# Patient Record
Sex: MALE | Race: WHITE | HISPANIC OR LATINO | Employment: FULL TIME | URBAN - METROPOLITAN AREA
[De-identification: names, ages, dates, MRNs, and addresses within clinical notes are randomized per-mention and may not be internally consistent; named-entity substitution may affect disease eponyms.]

---

## 2023-11-01 ENCOUNTER — HOSPITAL ENCOUNTER (EMERGENCY)
Facility: HOSPITAL | Age: 53
Discharge: HOME | End: 2023-11-01
Attending: EMERGENCY MEDICINE
Payer: OTHER GOVERNMENT

## 2023-11-01 VITALS
HEART RATE: 86 BPM | HEIGHT: 67 IN | SYSTOLIC BLOOD PRESSURE: 140 MMHG | BODY MASS INDEX: 26.37 KG/M2 | DIASTOLIC BLOOD PRESSURE: 92 MMHG | TEMPERATURE: 98 F | OXYGEN SATURATION: 98 % | RESPIRATION RATE: 18 BRPM | WEIGHT: 168 LBS

## 2023-11-01 DIAGNOSIS — J06.9 VIRAL URI: Primary | ICD-10-CM

## 2023-11-01 LAB
CTP QC/QA: YES
MOLECULAR STREP A: NEGATIVE
POC MOLECULAR INFLUENZA A AGN: NEGATIVE
POC MOLECULAR INFLUENZA B AGN: NEGATIVE
SARS-COV-2 RDRP RESP QL NAA+PROBE: NEGATIVE

## 2023-11-01 PROCEDURE — 99283 EMERGENCY DEPT VISIT LOW MDM: CPT

## 2023-11-01 PROCEDURE — 87635 SARS-COV-2 COVID-19 AMP PRB: CPT

## 2023-11-01 PROCEDURE — 87502 INFLUENZA DNA AMP PROBE: CPT

## 2023-11-01 PROCEDURE — 87651 STREP A DNA AMP PROBE: CPT

## 2023-11-01 RX ORDER — CETIRIZINE HYDROCHLORIDE 10 MG/1
10 TABLET ORAL DAILY
Qty: 30 TABLET | Refills: 0 | Status: SHIPPED | OUTPATIENT
Start: 2023-11-01 | End: 2023-11-01 | Stop reason: CLARIF

## 2023-11-01 RX ORDER — FLUTICASONE PROPIONATE 50 MCG
1 SPRAY, SUSPENSION (ML) NASAL 2 TIMES DAILY PRN
Qty: 15 G | Refills: 0 | Status: SHIPPED | OUTPATIENT
Start: 2023-11-01

## 2023-11-01 RX ORDER — CETIRIZINE HYDROCHLORIDE 10 MG/1
10 TABLET ORAL DAILY
Qty: 30 TABLET | Refills: 0 | Status: SHIPPED | OUTPATIENT
Start: 2023-11-01 | End: 2023-11-01 | Stop reason: SDUPTHER

## 2023-11-01 RX ORDER — ONDANSETRON 4 MG/1
4 TABLET, ORALLY DISINTEGRATING ORAL 2 TIMES DAILY PRN
Qty: 8 TABLET | Refills: 0 | Status: SHIPPED | OUTPATIENT
Start: 2023-11-01

## 2023-11-01 NOTE — ED PROVIDER NOTES
Encounter Date: 11/1/2023    SCRIBE #1 NOTE: I, Amber Cordongilma Nuno, am scribing for, and in the presence of,  Jen Cordova PA-C. I have scribed the following portions of the note - Other sections scribed: HPI, ROS, PE.       History     Chief Complaint   Patient presents with    Nasal Congestion     Congestion, sore throat, nausea and diarrhea for past 3 days     53 y. o. male with no pertinent PMHx presents to the ED for URI symptoms and diarrhea which is now improving.  Patient reports he is had nasal congestion, sore throat and intermittent rhinorrhea over the last 5 days.  He stated he was drinking with some friends on Saturday night and woke up Sunday with some abdominal upset.  He had 1 bout of vomiting that day and it has noted intermittent episodes of diarrhea over the last 3 days.  He states that diarrhea episodes are resolving as of today.  He states that some coworkers at work have tested positive for strep and flu over the last week.  No other alleviating or exacerbating factors. Denies any appetite change, cough, chest tightness, chest pain, SOB or other associated symptoms. NKDA.       The history is provided by the patient. No  was used.     Review of patient's allergies indicates:  No Known Allergies  History reviewed. No pertinent past medical history.  History reviewed. No pertinent surgical history.  History reviewed. No pertinent family history.  Social History     Tobacco Use    Smoking status: Never    Smokeless tobacco: Never   Substance Use Topics    Drug use: Never     Review of Systems   Constitutional:  Positive for fatigue (is resolved). Negative for appetite change and fever.   HENT:  Positive for congestion (mild), postnasal drip, rhinorrhea and sore throat (mild). Negative for ear pain, sinus pressure, sinus pain, trouble swallowing and voice change.    Eyes:  Negative for photophobia, pain and visual disturbance.   Respiratory:  Negative for cough, chest  tightness and shortness of breath.    Cardiovascular:  Negative for chest pain and leg swelling.   Gastrointestinal:  Positive for abdominal pain, diarrhea (is resolved), nausea (is resolved) and vomiting (is resolved). Negative for abdominal distention.   Genitourinary:  Negative for decreased urine volume, dysuria and hematuria.   Musculoskeletal:  Negative for back pain, myalgias and neck pain.   Skin:  Negative for rash.   Neurological:  Negative for syncope, weakness and headaches.       Physical Exam     Initial Vitals [11/01/23 1618]   BP Pulse Resp Temp SpO2   (!) 140/92 86 18 97.5 °F (36.4 °C) 98 %      MAP       --         Physical Exam    Nursing note and vitals reviewed.  Constitutional: He appears well-developed and well-nourished. He is not diaphoretic. No distress.   HENT:   Head: Normocephalic and atraumatic.   Right Ear: External ear normal.   Left Ear: External ear normal.   Nose: Nose normal.   Mouth/Throat: Oropharynx is clear and moist and mucous membranes are normal. No oropharyngeal exudate.   Bilateral tympanic membranes intact with clear serous fluid.  No erythema or signs of infection.  Bilateral nares patent, no rhinorrhea.  Oropharynx erythematous with cobblestoning pattern indicative of postnasal drip.  Bilateral tonsils unremarkable with no enlargement, exudate or erythema.  Handling oral secretions well.    Eyes: Conjunctivae and EOM are normal. Pupils are equal, round, and reactive to light. Right conjunctiva is not injected. Left conjunctiva is not injected. No scleral icterus.   sclera anicteric   Neck: Neck supple.   Normal range of motion.   Full passive range of motion without pain.     Cardiovascular:  Normal rate, regular rhythm, S1 normal, S2 normal, normal heart sounds and intact distal pulses.     Exam reveals no gallop and no friction rub.       No murmur heard.  Pulses:       Radial pulses are 2+ on the right side and 2+ on the left side.   Pulmonary/Chest: Effort normal  and breath sounds normal. No respiratory distress. He has no wheezes.   Abdominal: Abdomen is soft. Bowel sounds are normal. He exhibits no distension. There is no abdominal tenderness.   Exam unremarkable    Abdomen  is nondistended, soft and nontender in all other quadrants. Normoactive bowel sounds. Nonperitoneal, no guarding or rigidity. No palpable masses or hepatosplenomegaly.  No suprapubic pain. No CVAT.    No overlying skin changes. Distal pulses 2+ b/l. (-)  Guardado's sign. (-)  Rebound Tenderness      Musculoskeletal:         General: Normal range of motion.      Cervical back: Full passive range of motion without pain, normal range of motion and neck supple.      Comments: Good active ROM of all extremities. No lower extremity edema or cyanosis.     Lymphadenopathy:     He has no cervical adenopathy.   Neurological: He is alert and oriented to person, place, and time. No cranial nerve deficit or sensory deficit. Gait normal.   A&Ox4, normal speech   Skin: Skin is warm. No ecchymosis and no rash noted.   Psychiatric: He has a normal mood and affect. Thought content normal.         ED Course   Procedures  Labs Reviewed   SARS-COV-2 RDRP GENE   POCT STREP A MOLECULAR   POCT INFLUENZA A/B MOLECULAR          Imaging Results    None          Medications - No data to display  Medical Decision Making  This is an evaluation of a 53 y.o. male that presents to the Emergency Department for improving rhinorrhea and nasal congestion for 5 days and resolved GI upset. The patient is a non-toxic, afebrile, and well appearing male. On physical exam ears and pharynx are without evidence of infection. Appears well hydrated with moist mucus membranes. Neck soft and supple with no meningeal signs or cervical lymphadenopathy.  Erythematous oropharynx with notable cobblestoning. Breath sounds are clear and equal bilaterally with no adventitious breath sounds, tachypnea or respiratory distress with room air pulse ox of 98% and no  evidence of hypoxia.     Vital Signs Are Reassuring.  Covid, flu and strep negative.    My overall impression is Viral URI with resolved viral gastroenteritis. I considered, but at this time, do not suspect OM, OE, PTA, strep pharyngitis, retropharyngeal abscess, espinoza angina, meningitis, pneumonia, UTI, testicular torsion, appendicitis, pancreatitis, or acute bacterial sinusitis.    Patient discharged home with supportive care including antihistamine and nasal spray.  Also sent home with short course of antiemetics in case nausea persists.  Emphasized the importance of oral hydration and monitoring for fevers.  Advised to treat with Tylenol or ibuprofen for symptoms the 100.4 and above. The diagnosis, treatment plan, instructions for follow-up and reevaluation with PCP as well as ED return precautions were discussed and understanding was verbalized. All questions or concerns have been addressed.     Amount and/or Complexity of Data Reviewed  Labs: ordered. Decision-making details documented in ED Course.    Risk  OTC drugs.  Prescription drug management.  Diagnosis or treatment significantly limited by social determinants of health.            Scribe Attestation:   Scribe #1: I performed the above scribed service and the documentation accurately describes the services I performed. I attest to the accuracy of the note.        ED Course as of 11/03/23 1354   Wed Nov 01, 2023   1700 SARS-CoV-2 RNA, Amplification, Qual: Negative [CC]   1700 POC Molecular Influenza A Ag: Negative [CC]   1700 POC Molecular Influenza B Ag: Negative [CC]   1700 Molecular Strep A, POC: Negative [CC]      ED Course User Index  [CC] Jen Cordova PA-C I, Carly Caballero, PA-C, personally performed the services described in this documentation. All medical record entries made by the scribe were at my direction and in my presence. I have reviewed the chart and agree that the record reflects my personal performance and is  accurate and complete.     Clinical Impression:   Final diagnoses:  [J06.9] Viral URI (Primary)        ED Disposition Condition    Discharge Stable          ED Prescriptions       Medication Sig Dispense Start Date End Date Auth. Provider    ondansetron (ZOFRAN-ODT) 4 MG TbDL Take 1 tablet (4 mg total) by mouth 2 (two) times daily as needed (nausea). 8 tablet 11/1/2023 -- Jen Cordova PA-C    fluticasone propionate (FLONASE) 50 mcg/actuation nasal spray 1 spray (50 mcg total) by Each Nostril route 2 (two) times daily as needed for Rhinitis or Allergies. 15 g 11/1/2023 -- Jen Cordova PA-C    cetirizine (ZYRTEC) 10 MG tablet  (Status: Discontinued) Take 1 tablet (10 mg total) by mouth once daily. 30 tablet 11/1/2023 11/1/2023 Jen Cordova PA-C    cetirizine (ZYRTEC) 10 MG tablet  (Status: Discontinued) Take 1 tablet (10 mg total) by mouth once daily. 30 tablet 11/1/2023 11/1/2023 Jen Cordova PA-C          Follow-up Information       Follow up With Specialties Details Why Contact Info    [facility] - Emergency Dept Emergency Medicine Go to  For new or worsening symptoms [address] [facility] [place] [id number] [phone]             Jen Cordova PA-C  11/03/23 3370

## 2023-11-01 NOTE — DISCHARGE INSTRUCTIONS
It is important that to rest and stay hydrated.  Use nasal spray to help with nasal congestion and postnasal drip. The 'BRAT' diet is suggested, then progress to diet as tolerated as symptoms audra. Call if bloody stools, persistent diarrhea, vomiting, fever or abdominal pain.  Follow up with PCP for further evaluation and management as necessary.    Thank you for coming to our Emergency Department today. It is important to remember that some problems or medical conditions are difficult to diagnose and may not be found or addressed during your Emergency Department visit.  These conditions often start with non-specific symptoms and can only be diagnosed on follow up visits with your primary care physician or specialist when the symptoms continue or change. Please remember that all medical conditions can change, and we cannot predict how you will be feeling tomorrow or the next day. Return to the ER with any questions/concerns, new/concerning symptoms, worsening or failure to improve.     Please return to ER if you experience severe dizziness, fever higher then 100.4 that persist after medication administration, uncontrolled nausea/vomiting or diarrhea or any other major concern like increased pain, chest pain, shortness of breath, inability to pass stool or gas, or difficulty breathing or swelling of the throat/mouth/tongue.    Be sure to follow up with your primary care doctor and review all labs/imaging/tests that were performed during your ER visit with them. It is very common for us to identify non-emergent incidental findings which must be followed up with your primary care physician.  Some labs/imaging/tests may be outside of the normal range, and require non-emergent follow-up and/or further investigation/treatment/procedures/testing to help diagnose/exclude/prevent complications or other potentially serious medical conditions. Some abnormalities may not have been discussed or addressed during your ER visit.      An ER visit does not replace a primary care visit, and many screening tests or follow-up tests cannot be ordered by an ER doctor or performed by the ER. Some tests may even require pre-approval.    If you do not have a primary care doctor, you may contact the one listed on your discharge paperwork or you may also call the [facility] Appointment Desk at [phone] , or [address] [place] [address] at  [phone] to schedule an appointment, or establish care with a primary care doctor or even a specialist and to obtain information about local resources. It is important to your health that you have a primary care doctor.    Please take all medications as directed. We have done our best to select a medication for you that will treat your condition however, all medications may potentially have side-effects and it is impossible to predict which medications may give you side-effects or what those side-effects (if any) those medications may give you.  If you feel that you are having a negative effect or side-effect of any medication you should stop taking those medications immediately and seek medical attention. If you feel that you are having a life-threatening reaction call 911.      Do not drive, swim, climb to height, take a bath, operate heavy machinery, drink alcohol or take potentially sedating medications, sign any legal documents or make any important decisions for 24 hours if you have received any pain medications, sedatives or mood altering drugs during your ER visit or within 24 hours of taking them if they have been prescribed to you.     You can find additional resources for Dentists, hearing aids, durable medical equipment, low cost pharmacies and other resources at [email or web address]

## 2023-11-29 ENCOUNTER — HOSPITAL ENCOUNTER (EMERGENCY)
Facility: HOSPITAL | Age: 53
Discharge: HOME | End: 2023-11-29
Attending: EMERGENCY MEDICINE
Payer: OTHER GOVERNMENT

## 2023-11-29 ENCOUNTER — OFFICE VISIT (OUTPATIENT)
Dept: OPHTHALMOLOGY | Facility: CLINIC | Age: 53
End: 2023-11-29
Attending: OPHTHALMOLOGY
Payer: OTHER GOVERNMENT

## 2023-11-29 ENCOUNTER — TELEPHONE (OUTPATIENT)
Dept: OPHTHALMOLOGY | Facility: CLINIC | Age: 53
End: 2023-11-29
Payer: OTHER GOVERNMENT

## 2023-11-29 VITALS
BODY MASS INDEX: 26.53 KG/M2 | TEMPERATURE: 99 F | HEIGHT: 67 IN | HEART RATE: 80 BPM | OXYGEN SATURATION: 100 % | WEIGHT: 169 LBS | DIASTOLIC BLOOD PRESSURE: 79 MMHG | SYSTOLIC BLOOD PRESSURE: 136 MMHG | RESPIRATION RATE: 20 BRPM

## 2023-11-29 DIAGNOSIS — H16.401 CORNEAL NEOVASCULARIZATION OF RIGHT EYE: ICD-10-CM

## 2023-11-29 DIAGNOSIS — H53.9 VISUAL DISTURBANCE: ICD-10-CM

## 2023-11-29 DIAGNOSIS — H35.052 CNVM (CHOROIDAL NEOVASCULAR MEMBRANE), LEFT: Primary | ICD-10-CM

## 2023-11-29 LAB
ALLENS TEST: NORMAL
ANION GAP SERPL CALC-SCNC: 16 MMOL/L (ref 8–16)
BUN SERPL-MCNC: 18 MG/DL (ref 6–30)
CHLORIDE SERPL-SCNC: 98 MMOL/L (ref 95–110)
CHOLEST SERPL-MCNC: 192 MG/DL (ref 120–199)
CHOLEST/HDLC SERPL: 3.1 {RATIO} (ref 2–5)
CREAT SERPL-MCNC: 1.2 MG/DL (ref 0.5–1.4)
GLUCOSE SERPL-MCNC: 92 MG/DL (ref 70–110)
HCT VFR BLD CALC: 50 %PCV (ref 36–54)
HDLC SERPL-MCNC: 62 MG/DL (ref 40–75)
HDLC SERPL: 32.3 % (ref 20–50)
LDLC SERPL CALC-MCNC: 110 MG/DL (ref 63–159)
NONHDLC SERPL-MCNC: 130 MG/DL
POC IONIZED CALCIUM: 1.25 MMOL/L (ref 1.06–1.42)
POC TCO2 (MEASURED): 29 MMOL/L (ref 23–29)
POTASSIUM BLD-SCNC: 3.8 MMOL/L (ref 3.5–5.1)
SAMPLE: NORMAL
SITE: NORMAL
SODIUM BLD-SCNC: 138 MMOL/L (ref 136–145)
TRIGL SERPL-MCNC: 100 MG/DL (ref 30–150)
TROPONIN I SERPL DL<=0.01 NG/ML-MCNC: <0.006 NG/ML (ref 0–0.03)
TSH SERPL DL<=0.005 MIU/L-ACNC: 1.67 UIU/ML (ref 0.4–4)

## 2023-11-29 PROCEDURE — 25000003 PHARM REV CODE 250: Performed by: EMERGENCY MEDICINE

## 2023-11-29 PROCEDURE — 99285 EMERGENCY DEPT VISIT HI MDM: CPT | Mod: 25,27

## 2023-11-29 PROCEDURE — 99999 PR PBB SHADOW E&M-EST. PATIENT-LVL III: ICD-10-PCS | Mod: PBBFAC,,, | Performed by: OPHTHALMOLOGY

## 2023-11-29 PROCEDURE — 99213 OFFICE O/P EST LOW 20 MIN: CPT | Mod: PBBFAC,25,PO | Performed by: OPHTHALMOLOGY

## 2023-11-29 PROCEDURE — 25500020 PHARM REV CODE 255: Performed by: EMERGENCY MEDICINE

## 2023-11-29 PROCEDURE — 84443 ASSAY THYROID STIM HORMONE: CPT | Performed by: EMERGENCY MEDICINE

## 2023-11-29 PROCEDURE — 99204 PR OFFICE/OUTPT VISIT, NEW, LEVL IV, 45-59 MIN: ICD-10-PCS | Mod: 25,S$PBB,, | Performed by: OPHTHALMOLOGY

## 2023-11-29 PROCEDURE — 80048 BASIC METABOLIC PNL TOTAL CA: CPT

## 2023-11-29 PROCEDURE — 93005 ELECTROCARDIOGRAM TRACING: CPT

## 2023-11-29 PROCEDURE — 67028 PR INJECT INTRAVITREAL PHARMCOLOGIC: ICD-10-PCS | Mod: S$PBB,LT,, | Performed by: OPHTHALMOLOGY

## 2023-11-29 PROCEDURE — 82565 ASSAY OF CREATININE: CPT

## 2023-11-29 PROCEDURE — 99204 OFFICE O/P NEW MOD 45 MIN: CPT | Mod: 25,S$PBB,, | Performed by: OPHTHALMOLOGY

## 2023-11-29 PROCEDURE — 99900035 HC TECH TIME PER 15 MIN (STAT)

## 2023-11-29 PROCEDURE — 67028 INJECTION EYE DRUG: CPT | Mod: S$PBB,LT,, | Performed by: OPHTHALMOLOGY

## 2023-11-29 PROCEDURE — 84484 ASSAY OF TROPONIN QUANT: CPT | Performed by: EMERGENCY MEDICINE

## 2023-11-29 PROCEDURE — 85014 HEMATOCRIT: CPT

## 2023-11-29 PROCEDURE — 80061 LIPID PANEL: CPT | Performed by: EMERGENCY MEDICINE

## 2023-11-29 PROCEDURE — 84132 ASSAY OF SERUM POTASSIUM: CPT

## 2023-11-29 PROCEDURE — 84295 ASSAY OF SERUM SODIUM: CPT

## 2023-11-29 PROCEDURE — 99999 PR PBB SHADOW E&M-EST. PATIENT-LVL III: CPT | Mod: PBBFAC,,, | Performed by: OPHTHALMOLOGY

## 2023-11-29 PROCEDURE — 92134 POSTERIOR SEGMENT OCT RETINA (OCULAR COHERENCE TOMOGRAPHY)-BOTH EYES: ICD-10-PCS | Mod: 26,S$PBB,, | Performed by: OPHTHALMOLOGY

## 2023-11-29 PROCEDURE — 93010 EKG 12-LEAD: ICD-10-PCS | Mod: ,,, | Performed by: INTERNAL MEDICINE

## 2023-11-29 PROCEDURE — 82330 ASSAY OF CALCIUM: CPT

## 2023-11-29 PROCEDURE — 67028 INJECTION EYE DRUG: CPT | Mod: PBBFAC,PO,LT | Performed by: OPHTHALMOLOGY

## 2023-11-29 PROCEDURE — 92134 CPTRZ OPH DX IMG PST SGM RTA: CPT | Mod: PBBFAC,PO | Performed by: OPHTHALMOLOGY

## 2023-11-29 PROCEDURE — 93010 ELECTROCARDIOGRAM REPORT: CPT | Mod: ,,, | Performed by: INTERNAL MEDICINE

## 2023-11-29 RX ORDER — SODIUM CHLORIDE 9 MG/ML
1000 INJECTION, SOLUTION INTRAVENOUS
Status: COMPLETED | OUTPATIENT
Start: 2023-11-29 | End: 2023-11-29

## 2023-11-29 RX ADMIN — Medication 1.25 MG: at 05:11

## 2023-11-29 RX ADMIN — SODIUM CHLORIDE 1000 ML: 0.9 INJECTION, SOLUTION INTRAVENOUS at 02:11

## 2023-11-29 RX ADMIN — IOHEXOL 75 ML: 350 INJECTION, SOLUTION INTRAVENOUS at 01:11

## 2023-11-29 NOTE — PROGRESS NOTES
Subjective:       Patient ID: Evgeny Ragland is a 53 y.o. male      Chief Complaint   Patient presents with    Concerns About Ocular Health     History of Present Illness  HPI    Pt states at 11:25 AM he was at his desk and noticed his vision in his   left eye became blurry and distorted.  He drove him self to the ER and   then he started to see a black shaped kidney bean shape. Pt was told years   ago that he was at risk for retinal detachment. Ultrasound was done at the   ER.      2006 IED blast partially detached retina in OS - pt never followed back up   with retinal specialist   Had a procedure OS.  Was told that it didn't fix the whole problem and   that he had a scar.  Gets yearly eye exams  -Flashes   -Floaters   -pain     H/o nearsightedness and glasses use in youth then refractive surgery    Last edited by Alfredo Huerta MD on 11/29/2023 10:24 PM.        Imaging:    See report    Assessment/Plan:     1. 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  Recommend Avastin OS.  Discussed RBA inc endophthalmitis  Discussed injection protocol, TREX, and visual expectations  Pt agrees with inj.  Wishes to proceed today    - Posterior Segment OCT Retina-Both eyes  - Prior authorization Order    Follow up in about 1 month (around 12/29/2023), or if symptoms worsen or fail to improve, for OCT and INJECTION ONLY, Injection Left eye, Avastin.     Patient identified.  Timeout performed.    Risks, benefits, and alternatives to treatment were discussed in detail with the patient, including bleeding/infection (endophthalmitis)/etc.  The patient voiced understanding and wished to proceed with the procedure.  See separate consent form.    Injection Procedure Note:  Diagnosis: CNVM Left Eye    Topical Proparacaine drop placed then topical 5%  Writer contacted patient. Informed patient that patients PCP office wants her to obtain a chest x-ray prior to surgical clearance. Patient verbalized understanding and is going to call her PCP office to get this chest x-ray completed. Informed patient that she should have this performed as soon as she can this morning to get cleared for surgery.    Patient verbalized understanding and had no further questions or concerns at this time.    Addendum:  Writer contacted office of [facility]. Patient had Chest X-Ray today and was cleared by [facility]. They are faxing over clearance paperwork stating that patient is now cleared for surgery. Office fax number provided. Will update Dr. Spring and AKI Alexander.   Betadine  Sterile gloves used, and sterile lid speculum placed.  5% Betadine placed at injection site again prior to injection.  Avastin 1.25mg in 0.05cc Injected inferotemporally 3.5-4mm posterior to the limbus.  Complications: None  Va at least CF at 5 feet post injection.  Retina, ONH, IOP normal after injection.    Followup as above.  Patient should return immediately PRN.  Retinal Detachment and Endophthalmitis precautions given.
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
## 2023-11-29 NOTE — DISCHARGE INSTRUCTIONS
Emergency department testing is within acceptable ranges.  CT scan of brain does not show stroke.  EKG and troponin do not suggest heart attack.  Your symptoms are likely related to a retinal issue.  Go to the ophthalmology clinic now for further evaluation.  Schedule follow-up with your primary physician.  Return to the emergency department for any new, worsening or significantly concerning symptoms.    Thank you for coming to our Emergency Department today. It is important to remember that some problems are difficult to diagnose and may not be found during your first visit. Be sure to follow up with your primary care doctor and review any labs/imaging that was performed with them. If you do not have a primary care doctor, you may contact the one listed on your discharge paperwork or you may also call the [facility] Appointment Desk at [phone] to schedule an appointment with one.     All medications may potentially have side effects and it is impossible to predict which medications may give you side effects. If you feel that you are having a negative effect of any medication you should immediately stop taking them and seek medical attention.    Return to the ER with any questions/concerns, new/concerning symptoms, worsening or failure to improve. Do not drive or make any important decisions for 24 hours if you have received any pain medications, sedatives or mood altering drugs during your ER visit.

## 2023-11-29 NOTE — TELEPHONE ENCOUNTER
----- Message from Chacha Kemp sent at 11/29/2023  2:47 PM CST -----  Consult/Advisory    Name Of Caller:Dr Hahn       Contact Preference:[phone]    Nature of call: Emergency  called regarding the ptn he will like the ptn to be seen today regarding possible retina detach

## 2023-11-29 NOTE — ED TRIAGE NOTES
"Pt to ED via POV with complaints of sudden onset of blurry vision to L eye, onset at 1130. States was sitting at desk and it all of a sudden became blurry. Pt states "thought it was because just got new glasses." Pt stating he now also has a "black spot in the shape of a kidney bean" in visual field. No pain. Pt awoke this AM with tingling to left arm for a few minutes. Reports "dog slept in bed with him so he may have slept wrong." Resolved after movement. Denies weakness, change in speech or dizziness. 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 NADN  "

## 2023-12-06 ENCOUNTER — TELEPHONE (OUTPATIENT)
Dept: SPORTS MEDICINE | Facility: CLINIC | Age: 53
End: 2023-12-06
Payer: OTHER GOVERNMENT

## 2023-12-06 NOTE — TELEPHONE ENCOUNTER
----- Message from Elsy Yuen MA sent at 12/6/2023  4:23 PM CST -----  Regarding: FW: Appt  Contact: [phone]  Please call and schedule with Alba.  ----- Message -----  From: Tami Pitt  Sent: 12/6/2023   4:08 PM CST  To: [facility]baum Staff  Subject: Appt                                             Pt is calling to speak to someone in the office to [facility] care Pt Dx Lower Left Leg calf Pt would like like to schedule an appt.Please call to advise  [phone] Thanks.

## 2023-12-11 NOTE — PROGRESS NOTES
CC: Left lower leg pain    53 y.o. Male who presents as a new patient to me.  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 .   [employer].  He reports an injury event about 2 months ago when he was running he had a painful pop over the posterior medial aspect of his knee and proximal lower leg region.  Localizes to the medial gastroc head region.  He states afterwards he had some bruising over that area.  He had some pain and mobility restriction around the calf region with some difficulty going up on his toes.  Self-directed therapy and this has gotten considerably better with time.  He does have some ongoing mild symptoms to include weakness and some intermittent associated discomfort.  He has returned to running and is feeling better.  Reports no knee specific issues.  Past medical history notable for prior bilateral knee arthroscopy and left partial medial meniscectomy around 2003.  At one point he also had a MCL repair by his account.  He is unsure which side.  Has not had issues in the left knee for quite some time.  No injections.  No back or radicular complaints.  No ipsilateral groin or hip pain.    REVIEW OF SYSTEMS:   Constitution: Negative. Negative for chills, fever and night sweats.    Hematologic/Lymphatic: Negative for bleeding problem. Does not bruise/bleed easily.   Skin: Negative for dry skin, itching and rash.   Musculoskeletal: Negative for falls. Positive for intermittent left lower leg soreness and muscle weakness.     All other review of symptoms were reviewed and found to be noncontributory.     PAST MEDICAL HISTORY:   History reviewed. No pertinent past medical history.    PAST SURGICAL HISTORY:   History reviewed. No pertinent surgical history.    FAMILY HISTORY:   History reviewed. No pertinent family history.    SOCIAL HISTORY:   Social History     Socioeconomic History    Marital status:    Tobacco Use    Smoking status: Never    Smokeless  tobacco: Never   Substance and Sexual Activity    Drug use: Never     Social Determinants of Health     Financial Resource Strain: Low Risk  (12/9/2023)    Overall Financial Resource Strain (CARDIA)     Difficulty of Paying Living Expenses: Not hard at all   Food Insecurity: No Food Insecurity (12/9/2023)    Hunger Vital Sign     Worried About Running Out of Food in the Last Year: Never true     Ran Out of Food in the Last Year: Never true   Transportation Needs: No Transportation Needs (12/9/2023)    PRAPARE - Transportation     Lack of Transportation (Medical): No     Lack of Transportation (Non-Medical): No   Physical Activity: Sufficiently Active (12/9/2023)    Exercise Vital Sign     Days of Exercise per Week: 6 days     Minutes of Exercise per Session: 40 min   Stress: No Stress Concern Present (12/9/2023)    [employer] [place] [employer] - Occupational Stress Questionnaire     Feeling of Stress : Not at all   Social Connections: Unknown (12/9/2023)    Social Connection and Isolation Panel [NHANES]     Frequency of Communication with Friends and Family: More than three times a week     Frequency of Social Gatherings with Friends and Family: More than three times a week     Active Member of Clubs or Organizations: Yes     Attends Club or Organization Meetings: More than 4 times per year     Marital Status:    Housing Stability: Low Risk  (12/9/2023)    Housing Stability Vital Sign     Unable to Pay for Housing in the Last Year: No     Number of Places Lived in the Last Year: 1     Unstable Housing in the Last Year: No     MEDICATIONS:     Current Outpatient Medications:     fluticasone propionate (FLONASE) 50 mcg/actuation nasal spray, 1 spray (50 mcg total) by Each Nostril route 2 (two) times daily as needed for Rhinitis or Allergies. (Patient not taking: Reported on 12/12/2023), Disp: 15 g, Rfl: 0    ondansetron (ZOFRAN-ODT) 4 MG TbDL, Take 1 tablet (4 mg total) by mouth 2 (two) times daily  "as needed (nausea). (Patient not taking: Reported on 12/12/2023), Disp: 8 tablet, Rfl: 0    ALLERGIES:   Review of patient's allergies indicates:  No Known Allergies     PHYSICAL EXAMINATION:  /78   Pulse 91   Ht 5' 7" (1.702 m)   Wt 85 kg (187 lb 6.3 oz)   BMI 29.35 kg/m²   General: Well-developed well-nourished 53 y.o. malein no acute distress   Cardiovascular: Regular rhythm by palpation of distal pulse, normal color and temperature, no concerning varicosities on symptomatic side   Lungs: No labored breathing or wheezing appreciated   Neuro: Alert and oriented ×3   Psychiatric: well oriented to person, place and time, demonstrates normal mood and affect   Skin: No rashes, lesions or ulcers, normal temperature, turgor, and texture on involved extremity    Ortho/SPM Exam  Examination of the left knee demonstrates evidence of prior arthroscopic surgery.  No effusion.  No tenderness to palpation.  No pain medially with varus load.  Stable to varus and valgus stress.  No pain to valgus stress.  He does have some tenderness along the pes insertion.  Nontender over the medial head of the gastroc.  Intact calf to resisted function.  No pain with resisted plantar flexion with the knee in extension and 90° of flexion.  Intact passive dorsiflexion stretch again with the knee in extension and 90° of flexion without pain.  Intact double and single heel rise.  Some mild weakness to single heel rise but no significant pain.  Intact Achilles.  No pain to resisted knee flexion.  No significant calf atrophy.  No palpable areas of tenderness or abnormalities.    IMAGING:  X-rays including two views of the left tib-fib views ordered and images reviewed by me show:    No acute findings.    MRI left tib-fib (10/4/23, DIS):  Report documents a normal study.  I see no significant abnormalities of the lower leg.  On my read, he does have some subchondral bone marrow edema involving the medial compartment and evidence of medial " meniscal deficiency status post prior partial meniscectomy.  Possible chronic medial meniscus re-tear.  No evidence of distal pes hamstring insertional tearing.    ASSESSMENT:      ICD-10-CM ICD-9-CM   1. Strain of gastrocnemius muscle of left lower extremity, initial encounter  S86.112A 844.8   2. Pain of left lower leg  M79.662 729.5       PLAN:     Steady clinical improvement.  I suspect he had a grade 2 strain of the medial gastroc head with partial tearing.  Good function today.  No swelling or pain on exam of the knee.  Discussed treatment options.  I do not think we need any additional workup or specific treatment at this time.  Does have some bilateral pes planus and we discussed the potential benefit of some over-the-counter medial arch support orthotics for his shoes.  May continue to progress athletic and training activity as symptoms allow.  All questions answered.    Procedures

## 2023-12-12 ENCOUNTER — HOSPITAL ENCOUNTER (OUTPATIENT)
Dept: RADIOLOGY | Facility: HOSPITAL | Age: 53
Discharge: HOME | End: 2023-12-12
Attending: ORTHOPAEDIC SURGERY
Payer: OTHER GOVERNMENT

## 2023-12-12 ENCOUNTER — OFFICE VISIT (OUTPATIENT)
Dept: SPORTS MEDICINE | Facility: CLINIC | Age: 53
End: 2023-12-12
Payer: OTHER GOVERNMENT

## 2023-12-12 VITALS
DIASTOLIC BLOOD PRESSURE: 78 MMHG | HEIGHT: 67 IN | HEART RATE: 91 BPM | SYSTOLIC BLOOD PRESSURE: 124 MMHG | WEIGHT: 187.38 LBS | BODY MASS INDEX: 29.41 KG/M2

## 2023-12-12 DIAGNOSIS — M79.662 PAIN OF LEFT LOWER LEG: ICD-10-CM

## 2023-12-12 DIAGNOSIS — S86.112A STRAIN OF GASTROCNEMIUS MUSCLE OF LEFT LOWER EXTREMITY, INITIAL ENCOUNTER: Primary | ICD-10-CM

## 2023-12-12 PROCEDURE — 99204 OFFICE O/P NEW MOD 45 MIN: CPT | Mod: S$PBB,,, | Performed by: ORTHOPAEDIC SURGERY

## 2023-12-12 PROCEDURE — 73590 X-RAY EXAM OF LOWER LEG: CPT | Mod: TC,LT

## 2023-12-12 PROCEDURE — 73590 XR TIBIA FIBULA 2 VIEW LEFT: ICD-10-PCS | Mod: 26,LT,, | Performed by: RADIOLOGY

## 2023-12-12 PROCEDURE — 73590 X-RAY EXAM OF LOWER LEG: CPT | Mod: 26,LT,, | Performed by: RADIOLOGY

## 2023-12-12 PROCEDURE — 99999 PR PBB SHADOW E&M-EST. PATIENT-LVL III: ICD-10-PCS | Mod: PBBFAC,,, | Performed by: ORTHOPAEDIC SURGERY

## 2023-12-12 PROCEDURE — 99213 OFFICE O/P EST LOW 20 MIN: CPT | Mod: PBBFAC | Performed by: ORTHOPAEDIC SURGERY

## 2023-12-12 PROCEDURE — 99204 PR OFFICE/OUTPT VISIT, NEW, LEVL IV, 45-59 MIN: ICD-10-PCS | Mod: S$PBB,,, | Performed by: ORTHOPAEDIC SURGERY

## 2023-12-12 PROCEDURE — 99999 PR PBB SHADOW E&M-EST. PATIENT-LVL III: CPT | Mod: PBBFAC,,, | Performed by: ORTHOPAEDIC SURGERY

## 2024-01-08 ENCOUNTER — OFFICE VISIT (OUTPATIENT)
Dept: OPHTHALMOLOGY | Facility: CLINIC | Age: 54
End: 2024-01-08
Attending: OPHTHALMOLOGY
Payer: OTHER GOVERNMENT

## 2024-01-08 DIAGNOSIS — H35.3221 EXUDATIVE AGE-RELATED MACULAR DEGENERATION OF LEFT EYE WITH ACTIVE CHOROIDAL NEOVASCULARIZATION: Primary | ICD-10-CM

## 2024-01-08 PROCEDURE — 99999PBSHW PR PBB SHADOW TECHNICAL ONLY FILED TO HB: Mod: PBBFAC,,,

## 2024-01-08 PROCEDURE — 99499 UNLISTED E&M SERVICE: CPT | Mod: S$PBB,,, | Performed by: OPHTHALMOLOGY

## 2024-01-08 PROCEDURE — 99213 OFFICE O/P EST LOW 20 MIN: CPT | Mod: PBBFAC,25 | Performed by: OPHTHALMOLOGY

## 2024-01-08 PROCEDURE — 99999 PR PBB SHADOW E&M-EST. PATIENT-LVL III: CPT | Mod: PBBFAC,,, | Performed by: OPHTHALMOLOGY

## 2024-01-08 PROCEDURE — 67028 INJECTION EYE DRUG: CPT | Mod: PBBFAC,LT | Performed by: OPHTHALMOLOGY

## 2024-01-08 PROCEDURE — 92134 CPTRZ OPH DX IMG PST SGM RTA: CPT | Mod: PBBFAC | Performed by: OPHTHALMOLOGY

## 2024-01-08 PROCEDURE — 67028 INJECTION EYE DRUG: CPT | Mod: S$PBB,LT,, | Performed by: OPHTHALMOLOGY

## 2024-01-08 RX ORDER — BACITRACIN 500 [USP'U]/G
OINTMENT OPHTHALMIC
COMMUNITY
Start: 2023-08-31 | End: 2024-08-29

## 2024-01-08 RX ORDER — DOXYCYCLINE HYCLATE 100 MG
100 TABLET ORAL 2 TIMES DAILY
COMMUNITY
Start: 2023-09-29

## 2024-01-08 RX ORDER — BENZONATATE 100 MG/1
CAPSULE ORAL
COMMUNITY
Start: 2023-12-27 | End: 2024-12-26

## 2024-01-08 RX ORDER — ALBUTEROL SULFATE 90 UG/1
AEROSOL, METERED RESPIRATORY (INHALATION)
COMMUNITY
Start: 2023-08-30

## 2024-01-08 RX ORDER — NEOMYCIN SULFATE, POLYMYXIN B SULFATE, AND DEXAMETHASONE 3.5; 10000; 1 MG/G; [USP'U]/G; MG/G
OINTMENT OPHTHALMIC
COMMUNITY
Start: 2023-09-29

## 2024-01-08 RX ADMIN — Medication 1.25 MG: at 11:01

## 2024-01-08 NOTE — PROGRESS NOTES
Subjective:       Patient ID: Evgeny Ragland is a 53 y.o. male      Chief Complaint   Patient presents with    Follow-up     History of Present Illness  HPI    1m OCT/SHANNON OS only    Pt states va is improved OS since last visit. Got better right after   injection.  Resolved floaters OS.     No flashes  No floaters  No pain  Gtts: At's PRN OU    POHx:  1. CNVM (choroidal neovascular membrane), left  2. Corneal neovascularization of right eye    Last edited by Alfredo Huerta MD on 1/8/2024 11:40 AM.        Imaging:    See report    Assessment/Plan:     1. 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    Excellent result about 6 wks after Av #1  Rec Av OS today and rapid TREX    Recommend Avastin OS today and 7-8 wks  Pt agrees    - Posterior Segment OCT Retina-Both eyes  - Prior authorization Order    Follow up in about 7 weeks (around 2/26/2024) for OCT and INJECTION ONLY, Injection Left eye, Avastin.     Patient identified.  Timeout performed.    Risks, benefits, and alternatives to treatment were discussed in detail with the patient, including bleeding/infection (endophthalmitis)/etc.  The patient voiced understanding and wished to proceed with the procedure.  See separate consent form.    Injection Procedure Note:  Diagnosis: CNVM Left Eye    Topical Proparacaine drop placed then topical 5% Betadine  Sterile gloves used, and sterile lid speculum placed.  5% Betadine placed at injection site again prior to injection.  Avastin 1.25mg in 0.05cc Injected inferotemporally 3.5-4mm posterior to the limbus.  Complications: None  Va at least CF at 5 feet post injection.  Retina, ONH, IOP normal after injection.    Followup as above.  Patient should return immediately PRN.  Retinal Detachment and Endophthalmitis precautions given.

## 2024-03-04 ENCOUNTER — PROCEDURE VISIT (OUTPATIENT)
Dept: OPHTHALMOLOGY | Facility: CLINIC | Age: 54
End: 2024-03-04
Payer: OTHER GOVERNMENT

## 2024-03-04 DIAGNOSIS — H35.052 CNVM (CHOROIDAL NEOVASCULAR MEMBRANE), LEFT: Primary | ICD-10-CM

## 2024-03-04 DIAGNOSIS — H35.3221 EXUDATIVE AGE-RELATED MACULAR DEGENERATION OF LEFT EYE WITH ACTIVE CHOROIDAL NEOVASCULARIZATION: ICD-10-CM

## 2024-03-04 PROCEDURE — 67028 INJECTION EYE DRUG: CPT | Mod: S$PBB,LT,, | Performed by: OPHTHALMOLOGY

## 2024-03-04 PROCEDURE — 99999PBSHW PR PBB SHADOW TECHNICAL ONLY FILED TO HB: Mod: JZ,PBBFAC,,

## 2024-03-04 PROCEDURE — 67028 INJECTION EYE DRUG: CPT | Mod: PBBFAC,LT | Performed by: OPHTHALMOLOGY

## 2024-03-04 PROCEDURE — 99499 UNLISTED E&M SERVICE: CPT | Mod: S$PBB,,, | Performed by: OPHTHALMOLOGY

## 2024-03-04 PROCEDURE — 92134 CPTRZ OPH DX IMG PST SGM RTA: CPT | Mod: PBBFAC | Performed by: OPHTHALMOLOGY

## 2024-03-04 RX ADMIN — Medication 1.25 MG: at 11:03

## 2024-03-04 NOTE — PROGRESS NOTES
"Subjective:       Patient ID: Evgeny Ragland is a 53 y.o. male      Chief Complaint   Patient presents with    Injections     History of Present Illness  HPI    7 week Oct/Avastin Os   Dls: 01/08/2024 Dr. Huerta     Pt states he started physical therapy about 3 weeks ago and has noticed he   has a "pulsating" feeling in his os and sometimes feel pressure. He adds   that its more noticeable when he is active or stressed.   Otherwise vision is good and doing well  -Flashes  -Floaters   -pain     Eye meds:   No gtts   Last edited by Alfredo Huerta MD on 3/4/2024 10:54 AM.        Imaging:    See report    Assessment/Plan:     1. 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    Excellent result about 6 wks after Av #1  Doing well today 8 wks s/p Av #2  Rec Av OS today and rapid TREX    Recommend Avastin OS today and 10 wks  Pt agrees    - Posterior Segment OCT Retina-Both eyes  - Prior authorization Order    Follow up in about 10 weeks (around 5/13/2024), or if symptoms worsen or fail to improve, for OCT and INJECTION ONLY, Injection Left eye, Avastin.     Patient identified.  Timeout performed.    Risks, benefits, and alternatives to treatment were discussed in detail with the patient, including bleeding/infection (endophthalmitis)/etc.  The patient voiced understanding and wished to proceed with the procedure.  See separate consent form.    Injection Procedure Note:  Diagnosis: CNVM Left Eye    Topical Proparacaine drop placed then topical 5% Betadine  Sterile gloves used, and sterile lid speculum placed.  5% Betadine placed at injection site again prior to injection.  Avastin 1.25mg in 0.05cc Injected inferotemporally 3.5-4mm posterior to the limbus.  Complications: None  Va at least CF at 5 feet post injection.  Retina, ONH, IOP normal after " injection.    Followup as above.  Patient should return immediately PRN.  Retinal Detachment and Endophthalmitis precautions given.

## 2024-05-13 ENCOUNTER — TELEPHONE (OUTPATIENT)
Dept: OPHTHALMOLOGY | Facility: CLINIC | Age: 54
End: 2024-05-13
Payer: OTHER GOVERNMENT

## 2024-05-24 ENCOUNTER — PROCEDURE VISIT (OUTPATIENT)
Dept: OPHTHALMOLOGY | Facility: CLINIC | Age: 54
End: 2024-05-24
Payer: OTHER GOVERNMENT

## 2024-05-24 DIAGNOSIS — H35.052 CNVM (CHOROIDAL NEOVASCULAR MEMBRANE), LEFT: Primary | ICD-10-CM

## 2024-05-24 PROCEDURE — 67028 INJECTION EYE DRUG: CPT | Mod: PBBFAC,LT | Performed by: OPHTHALMOLOGY

## 2024-05-24 PROCEDURE — 99999PBSHW PR PBB SHADOW TECHNICAL ONLY FILED TO HB: Mod: JZ,PBBFAC,,

## 2024-05-24 PROCEDURE — 67028 INJECTION EYE DRUG: CPT | Mod: S$PBB,LT,, | Performed by: OPHTHALMOLOGY

## 2024-05-24 PROCEDURE — 92134 CPTRZ OPH DX IMG PST SGM RTA: CPT | Mod: PBBFAC | Performed by: OPHTHALMOLOGY

## 2024-05-24 PROCEDURE — 99499 UNLISTED E&M SERVICE: CPT | Mod: S$PBB,,, | Performed by: OPHTHALMOLOGY

## 2024-05-24 RX ORDER — SODIUM PICOSULFATE, MAGNESIUM OXIDE, AND ANHYDROUS CITRIC ACID 12; 3.5; 1 G/175ML; G/175ML; MG/175ML
LIQUID ORAL
COMMUNITY
Start: 2024-04-10

## 2024-05-24 RX ADMIN — Medication 1.25 MG: at 08:05

## 2024-05-24 NOTE — PROGRESS NOTES
Subjective:       Patient ID: Evgeny Ragland is a 53 y.o. male      Chief Complaint   Patient presents with    Follow-up    Injections     History of Present Illness  HPI    10 wk OCT/SHANNON OS only    Pt states va is stable since last visit. Resolved pressure sensation. No   new symptoms or concerns today.    No flashes  No floaters  No pain  No gtts    POHx:  CNVM (choroidal neovascular membrane), left  Exudative age-related macular degeneration of left eye with active   choroidal neovascularization    Last edited by Cassandra Ann on 5/24/2024  8:15 AM.        Imaging:    See report    Assessment/Plan:     1. 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    Excellent result about 6 wks after Av #1    Recommend CPM with plan for Avastin OS until can TREX to 3 mo then reeval  Pt agrees  Av OS today (pt may be moving to [place] by the time needs f/u.  Will f/u there if so)    - Posterior Segment OCT Retina-Both eyes  - Prior authorization Order    Follow up in about 3 months (around 8/24/2024), or if symptoms worsen or fail to improve, for Dilated examination, OCT Mac.     Patient identified.  Timeout performed.    Risks, benefits, and alternatives to treatment were discussed in detail with the patient, including bleeding/infection (endophthalmitis)/etc.  The patient voiced understanding and wished to proceed with the procedure.  See separate consent form.    Injection Procedure Note:  Diagnosis: CNVM Left Eye    Topical Proparacaine drop placed then topical 5% Betadine  Sterile gloves used, and sterile lid speculum placed.  5% Betadine placed at injection site again prior to injection.  Avastin 1.25mg in 0.05cc Injected inferotemporally 3.5-4mm posterior to the limbus.  Complications: None  Va at least CF at 5 feet post injection.  Retina, ONH, IOP normal  after injection.    Followup as above.  Patient should return immediately PRN.  Retinal Detachment and Endophthalmitis precautions given.

## 2024-08-20 DIAGNOSIS — H35.3221 EXUDATIVE AGE-RELATED MACULAR DEGENERATION OF LEFT EYE WITH ACTIVE CHOROIDAL NEOVASCULARIZATION: ICD-10-CM

## 2024-08-20 DIAGNOSIS — H35.052 CNVM (CHOROIDAL NEOVASCULAR MEMBRANE), LEFT: Primary | ICD-10-CM
